# Patient Record
Sex: MALE | Race: WHITE | NOT HISPANIC OR LATINO | Employment: FULL TIME | URBAN - METROPOLITAN AREA
[De-identification: names, ages, dates, MRNs, and addresses within clinical notes are randomized per-mention and may not be internally consistent; named-entity substitution may affect disease eponyms.]

---

## 2021-07-16 ENCOUNTER — HISTORICAL (OUTPATIENT)
Dept: ADMINISTRATIVE | Facility: HOSPITAL | Age: 46
End: 2021-07-16

## 2021-07-20 ENCOUNTER — HISTORICAL (OUTPATIENT)
Dept: ADMINISTRATIVE | Facility: HOSPITAL | Age: 46
End: 2021-07-20

## 2021-07-20 LAB
ALBUMIN SERPL-MCNC: 4.4 GM/DL (ref 3.5–5)
ALBUMIN/GLOB SERPL: 1.7 RATIO (ref 1.1–2)
ALP SERPL-CCNC: 67 UNIT/L (ref 40–150)
ALT SERPL-CCNC: 16 UNIT/L (ref 0–55)
AST SERPL-CCNC: 22 UNIT/L (ref 5–34)
BILIRUB SERPL-MCNC: 3.2 MG/DL
BILIRUBIN DIRECT+TOT PNL SERPL-MCNC: 0.7 MG/DL (ref 0–0.5)
BILIRUBIN DIRECT+TOT PNL SERPL-MCNC: 2.5 MG/DL (ref 0–0.8)
BUN SERPL-MCNC: 14.8 MG/DL (ref 8.9–20.6)
CALCIUM SERPL-MCNC: 9.8 MG/DL (ref 8.4–10.2)
CHLORIDE SERPL-SCNC: 104 MMOL/L (ref 98–107)
CO2 SERPL-SCNC: 27 MMOL/L (ref 22–29)
CREAT SERPL-MCNC: 0.87 MG/DL (ref 0.73–1.18)
EST CREAT CLEARANCE SER (OHS): 111.45 ML/MIN
GLOBULIN SER-MCNC: 2.6 GM/DL (ref 2.4–3.5)
GLUCOSE SERPL-MCNC: 97 MG/DL (ref 74–100)
POTASSIUM SERPL-SCNC: 3.6 MMOL/L (ref 3.5–5.1)
PROT SERPL-MCNC: 7 GM/DL (ref 6.4–8.3)
SARS-COV-2 RNA RESP QL NAA+PROBE: NOT DETECTED
SODIUM SERPL-SCNC: 137 MMOL/L (ref 136–145)

## 2021-07-22 ENCOUNTER — HISTORICAL (OUTPATIENT)
Dept: SURGERY | Facility: HOSPITAL | Age: 46
End: 2021-07-22

## 2022-04-10 ENCOUNTER — HISTORICAL (OUTPATIENT)
Dept: ADMINISTRATIVE | Facility: HOSPITAL | Age: 47
End: 2022-04-10

## 2022-04-29 VITALS
WEIGHT: 199.94 LBS | DIASTOLIC BLOOD PRESSURE: 76 MMHG | BODY MASS INDEX: 30.01 KG/M2 | BODY MASS INDEX: 27.99 KG/M2 | HEIGHT: 71 IN | HEIGHT: 68 IN | WEIGHT: 198 LBS | SYSTOLIC BLOOD PRESSURE: 131 MMHG

## 2022-04-30 NOTE — H&P
Patient:   Jay Rincon             MRN: [id number]            FIN: [id number]               Age:   45 years     Sex:  Male     :  1975   Associated Diagnoses:   None   Author:   Juan COLLADO, Mary Martino      Chief Complaint   C/O laceration to Lt hand possible surgery.   History of Present Illness   45-year-old male who presents with pain and inability to use the left arm. 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 He says that on 2021 a ceramic tile fell on the dorsal side of his hand and he sustained a small laceration about the first dorsal webspace. After injury he went to an urgent care received antibiotics which she is still taking in the superficial skin was closed. He has inability to extend about the IP joint of the thumb.   Review of Systems   Constitutional: no fever, fatigue, weakness  Eye: no vision loss, eye redness, drainage, or pain  ENMT: no sore throat, ear pain, sinus pain/congestion, nasal congestion/drainage  Respiratory: no cough, no wheezing, no shortness of breath  Cardiovascular: no chest pain, no palpitations, no edema  Gastrointestinal: no nausea, vomiting, or diarrhea. No abdominal pain      Physical Exam   General: No acute distress  CV: Regular rhythm by peripheral pulse  Lungs: Unlabored breathing on room air  Left hand  Has a small 1 cm laceration about the first dorsal compartment with sutures in place, no surrounding fluctuance or drainage.  NTTP throughout the hand.  The thumb is held flexed about the IP joint. He is unable to actively extend about the IP joint, able to passively extend to neutral. He is able to abduct and adduct about the thumb.  He is able to flex/extend about the first CMC joint. He is able to flex/extend about fingers 24  Sensation intact throughout the hand. He has some moderate numbness over the ulnar side of the thumb.  RP 2+, fingers are warm well perfused, cap refill less than 2 seconds throughout all  fingers    Radiology  Independent review  X-ray left hand demonstrates no acute osseous abnormalities, fractures, or dislocations   Assessment/Plan   Extensor tendon rupture of hand S66.819A   45-year-old male who has a left EPL tendon laceration sustained on 7/12/2021.  - OR today for Left EPL tendon repair

## 2022-04-30 NOTE — OP NOTE
Patient:   Jay Rincon             MRN: [id number]            FIN: [id number]               Age:   45 years     Sex:  Male     :  1975   Associated Diagnoses:   None   Author:   Mauri COLLADO, Zion      Operative Note   Operative Information   Date/ Time:  2021 08:25:00.     Procedures Performed: Left extensor pollicis longus tendon repair   .     Indications: 45M suffered an injury to the left hand about 3 weeks prior when a piece of ceramic fell onto the hand causing a laceration. He was unable to extend his thumb. Indicated for operative repair. .     Preoperative Diagnosis: Left extensor pollicis longus tendon rupture.     Postoperative Diagnosis: Left extensor pollicis longus tendon rupture.     Surgeon: Taurus Randall MD     Assistant: Zion Dotson MD .     Anesthesia: MAC + Local.     Intake and Output: NA.     Speciman Removed: NA.     Medications: 5cc 0.5% marcaine 5cc 1% lidocaine with epinephrine.     Esimated blood loss: loss  10  cc.     Description of Procedure/Findings/    Complications: Coomplete laceration of EPL tendon zone 4.     Complications: None.     Notes: Patient was seen and examined in the perop holding area. Site was markes, consents verified, and H&P updated. He was taken to the OR and placed supine on a table, arm board, MAC anaesthesia. A timeout was held. Preop ancef was administered. A tourniquet was raised to 250mm Hg.     We bagan our surgery by extending the traumautic laceration over the thumb proximally and distally about 1.5-2cm each direction. Care was taken to protect the radial sensory nerves and superficial veins encountered. The distal and proximal tendon stumps were identified and cleaned of pseudotendon. The 2 ends were repaired with 4-0 ethibond suture in a andrews and figure of 8 fashion with 4 crossing strands. The repiar was reinforced with a circumferential 5-0 running nylon stitch. This was done in thumb extension. Happy with the repair, the  site was washed, tourniquet let down and hemostasis ensured. We closed the wound with 4-0 monocryl in simple subcutaneous fashion followed by a running subcuticular stitch. A splint was placed after dermabond and steristrips. This concluded the operation. All needle and spong counts were correct.     Post Op Plan    Splint 2 weeks  RTC 2 weeks for wound check and transition to thumb spica splint  May begin gentle ROM at that time  NWB thumb 6 weeks   At 6 weeks, may begin to gently increase activities to full by 12 weeks .

## 2022-05-02 NOTE — HISTORICAL [facility] CERNER
This is a historical note converted from [employer]. Formatting and pictures may have been removed.  Please reference Ketty for original formatting and attached multimedia. Chief Complaint  C/O laceration to Lt hand possible surgery.  History of Present Illness  45-year-old male who presents with?pain and inability to use the left arm.? 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 He says that on 7/12/2021?a ceramic tile fell on the dorsal side of his hand?and he sustained a small laceration?about the first?dorsal webspace.? After injury?he went to?an urgent care?received antibiotics which she is still taking?in the superficial?skin was closed. ?He has inability to extend about the?IP joint of the thumb.  Review of Systems  Constitutional:?no fever, fatigue, weakness  Eye:?no vision loss, eye redness, drainage, or pain  ENMT:?no sore throat, ear pain, sinus pain/congestion, nasal congestion/drainage  Respiratory:?no cough, no wheezing, no shortness of breath  Cardiovascular:?no chest pain, no palpitations, no edema  Gastrointestinal:?no nausea, vomiting, or diarrhea. No abdominal pain  ?  ?  ?  Physical Exam  Vitals & Measurements  T:?36.4? ?C (Oral)? HR:?80(Peripheral)? RR:?16? BP:?131/76?  HT:?180.50?cm? WT:?90.700?kg? BMI:?27.84?  General: No acute distress  CV:?Regular rhythm by peripheral pulse  Lungs: Unlabored breathing on room air  Left hand  Has a?small?1 cm laceration?about the?first dorsal compartment with sutures in place, no surrounding fluctuance?or drainage.  NTTP throughout the hand.  The thumb is held flexed about the IP joint. ?He is unable to actively?extend about the IP?joint, able to passively extend to neutral.? He is able to abduct and adduct about the thumb.  He is able to flex/extend?about the first CMC joint.? He is able to flex/extend?about fingers 2-4  Sensation intact throughout the hand. ?He has some?moderate numbness over the?ulnar side of the thumb.  RP 2+, fingers are warm well  perfused, cap refill less than 2 seconds throughout all fingers  ?  Radiology  Independent review  X-ray left hand?demonstrates no acute osseous abnormalities, fractures, or dislocations  Assessment/Plan  Extensor tendon rupture of hand?S66.819A  45-year-old male who has a left?EPL?tendon laceration sustained on 7/12/2021.  ?  -Operative and nonoperative risk?were discussed with patient at length. ?He has elected to proceed with?operative intervention. ?He was booked and consented for?left?extensor tendon primary repair?on 7/22/2021 with Dr. Rodriguez.  ?  ATTENDING ADDENDUM  ?  Jay Rincon?was evaluated at the time of the encounter with?Dr Dotson.? HPI, PE and treatment plan was reviewed.? Treatment plan was reasonable and necessary.??Imaging was reviewed at the time of visit.??  ?  Case discussed with Dr. rodriguez.  ?  The risks, benefits, outcomes, and alternatives of conservative vs operative management were discussed with the patient in clinic today.? Informed consent was obtained for?EPL repair with Dr. Rodriguez.  ?  Ordered:  XR Hand Left Minimum 3 Views, Routine, 07/16/21 9:19:00 CDT, None, Ambulatory, Rad Type, Extensor tendon rupture of hand, Not Scheduled, 07/16/21 9:19:00 CDT  ?   Medications  Keflex 500 mg oral capsule, 500 mg= 1 cap(s), Oral, QID  Lortab 7.5/500 oral tablet, 1 tab(s), Oral, q4hr,? ?Not Taking, Completed Rx  Zofran ODT 4 mg oral tablet, disintegrating, 1, Oral, q4hr,? ?Not Taking, Completed Rx

## 2022-11-10 ENCOUNTER — LAB VISIT (OUTPATIENT)
Dept: LAB | Facility: HOSPITAL | Age: 47
End: 2022-11-10
Attending: SURGERY

## 2022-11-10 DIAGNOSIS — Z01.818 PREOPERATIVE EXAMINATION, UNSPECIFIED: Primary | ICD-10-CM

## 2022-11-10 LAB
ANION GAP SERPL CALC-SCNC: 8 MEQ/L
BASOPHILS # BLD AUTO: 0.03 X10(3)/MCL (ref 0–0.2)
BASOPHILS NFR BLD AUTO: 0.7 %
BUN SERPL-MCNC: 17.2 MG/DL (ref 8.9–20.6)
CALCIUM SERPL-MCNC: 9.6 MG/DL (ref 8.4–10.2)
CHLORIDE SERPL-SCNC: 104 MMOL/L (ref 98–107)
CO2 SERPL-SCNC: 26 MMOL/L (ref 22–29)
CREAT SERPL-MCNC: 0.9 MG/DL (ref 0.73–1.18)
CREAT/UREA NIT SERPL: 19
EOSINOPHIL # BLD AUTO: 0.06 X10(3)/MCL (ref 0–0.9)
EOSINOPHIL NFR BLD AUTO: 1.4 %
ERYTHROCYTE [DISTWIDTH] IN BLOOD BY AUTOMATED COUNT: 11.9 % (ref 11.5–17)
GFR SERPLBLD CREATININE-BSD FMLA CKD-EPI: >60 MLS/MIN/1.73/M2
GLUCOSE SERPL-MCNC: 82 MG/DL (ref 74–100)
HCT VFR BLD AUTO: 45.2 % (ref 42–52)
HGB BLD-MCNC: 15.3 GM/DL (ref 14–18)
IMM GRANULOCYTES # BLD AUTO: 0.01 X10(3)/MCL (ref 0–0.04)
IMM GRANULOCYTES NFR BLD AUTO: 0.2 %
LYMPHOCYTES # BLD AUTO: 1.88 X10(3)/MCL (ref 0.6–4.6)
LYMPHOCYTES NFR BLD AUTO: 44.7 %
MCH RBC QN AUTO: 30.5 PG (ref 27–31)
MCHC RBC AUTO-ENTMCNC: 33.8 MG/DL (ref 33–36)
MCV RBC AUTO: 90.2 FL (ref 80–94)
MONOCYTES # BLD AUTO: 0.34 X10(3)/MCL (ref 0.1–1.3)
MONOCYTES NFR BLD AUTO: 8.1 %
NEUTROPHILS # BLD AUTO: 1.9 X10(3)/MCL (ref 2.1–9.2)
NEUTROPHILS NFR BLD AUTO: 44.9 %
NRBC BLD AUTO-RTO: 0 %
PLATELET # BLD AUTO: 270 X10(3)/MCL (ref 130–400)
PMV BLD AUTO: 9.2 FL (ref 7.4–10.4)
POTASSIUM SERPL-SCNC: 4 MMOL/L (ref 3.5–5.1)
RBC # BLD AUTO: 5.01 X10(6)/MCL (ref 4.7–6.1)
SODIUM SERPL-SCNC: 138 MMOL/L (ref 136–145)
WBC # SPEC AUTO: 4.2 X10(3)/MCL (ref 4.5–11.5)

## 2022-11-10 PROCEDURE — 36415 COLL VENOUS BLD VENIPUNCTURE: CPT

## 2022-11-10 PROCEDURE — 85025 COMPLETE CBC W/AUTO DIFF WBC: CPT

## 2022-11-10 PROCEDURE — 80048 BASIC METABOLIC PNL TOTAL CA: CPT

## 2022-11-13 ENCOUNTER — PATIENT MESSAGE (OUTPATIENT)
Dept: ADMINISTRATIVE | Facility: OTHER | Age: 47
End: 2022-11-13

## 2022-11-14 ENCOUNTER — PATIENT MESSAGE (OUTPATIENT)
Dept: ADMINISTRATIVE | Facility: OTHER | Age: 47
End: 2022-11-14

## 2022-11-15 ENCOUNTER — PATIENT MESSAGE (OUTPATIENT)
Dept: ADMINISTRATIVE | Facility: OTHER | Age: 47
End: 2022-11-15

## 2022-11-15 PROBLEM — K40.90 LEFT INGUINAL HERNIA: Status: ACTIVE | Noted: 2022-11-15

## 2024-11-22 ENCOUNTER — OFFICE VISIT (OUTPATIENT)
Dept: FAMILY MEDICINE | Facility: CLINIC | Age: 49
End: 2024-11-22
Payer: COMMERCIAL

## 2024-11-22 VITALS
WEIGHT: 213.88 LBS | RESPIRATION RATE: 18 BRPM | HEIGHT: 71 IN | HEART RATE: 79 BPM | TEMPERATURE: 98 F | BODY MASS INDEX: 29.94 KG/M2 | SYSTOLIC BLOOD PRESSURE: 122 MMHG | DIASTOLIC BLOOD PRESSURE: 84 MMHG | OXYGEN SATURATION: 97 %

## 2024-11-22 DIAGNOSIS — Z13.220 ENCOUNTER FOR LIPID SCREENING FOR CARDIOVASCULAR DISEASE: ICD-10-CM

## 2024-11-22 DIAGNOSIS — Z13.6 ENCOUNTER FOR LIPID SCREENING FOR CARDIOVASCULAR DISEASE: ICD-10-CM

## 2024-11-22 DIAGNOSIS — Z83.49 FAMILY HISTORY OF THYROID DISEASE: ICD-10-CM

## 2024-11-22 DIAGNOSIS — Z12.11 COLON CANCER SCREENING: ICD-10-CM

## 2024-11-22 DIAGNOSIS — Z80.8 FAMILY HISTORY OF SKIN CANCER: ICD-10-CM

## 2024-11-22 DIAGNOSIS — Z00.00 WELLNESS EXAMINATION: Primary | ICD-10-CM

## 2024-11-22 DIAGNOSIS — N52.9 ERECTILE DYSFUNCTION, UNSPECIFIED ERECTILE DYSFUNCTION TYPE: ICD-10-CM

## 2024-11-22 DIAGNOSIS — Z12.5 SCREENING FOR MALIGNANT NEOPLASM OF PROSTATE: ICD-10-CM

## 2024-11-22 DIAGNOSIS — R73.01 ELEVATED FASTING GLUCOSE: ICD-10-CM

## 2024-11-22 DIAGNOSIS — E34.9 TESTOSTERONE DEFICIENCY: ICD-10-CM

## 2024-11-22 PROBLEM — K40.90 LEFT INGUINAL HERNIA: Status: RESOLVED | Noted: 2022-11-15 | Resolved: 2024-11-22

## 2024-11-22 NOTE — PROGRESS NOTES
Subjective:      Patient ID: Jay Rincon is a 49 y.o. male.    Chief Complaint: Establish Care (Establishing Care/No previous PCP/Testosterone Levels)    HPI  New Patient Wellness  No acute concerns  Patient is  to Maru Rincon, patient in practice. He is a , owns his own business, non smoker, eats mostly healthy less processed foods, avoiding seed oils, health conscious, working out during the week; does drink maybe 1-2 beers daily during week on average    Specialist: has seen dermatology this year, did have precancerous lesion removed from face    Chronic Medical Conditions:  Family history of skin cancer: patient UTD on check up 2024    Surgery history:  Left thumb tendon repair  Left inguinal hernia repair     Allergies:   NKDA    Family history:  Mom alive healthy  Dad alive, cancer skin   Brother  Sister  Paternal Uncle: Pancreatic cancer    Heart Disease:  Discussed possibly ordering CT calcium score next OV   Colon cancer screening: agreeable to cologuard      Health Maintenance Due   Topic Date Due    Hepatitis C Screening  Never done    Lipid Panel  Never done    HIV Screening  Never done    Hemoglobin A1c (Diabetic Prevention Screening)  Never done    Colorectal Cancer Screening  Never done     Review of Systems   Constitutional:  Negative for chills, fatigue and fever.   HENT:  Negative for congestion, ear pain, postnasal drip, rhinorrhea, sinus pressure and sore throat.    Eyes:  Negative for itching and visual disturbance.   Respiratory:  Negative for cough, shortness of breath and wheezing.    Cardiovascular:  Negative for chest pain, palpitations and leg swelling.   Gastrointestinal:  Negative for abdominal pain and nausea.   Genitourinary:  Negative for dysuria.   Musculoskeletal:  Negative for arthralgias and myalgias.   Skin:  Negative for rash.   Neurological:  Negative for weakness, light-headedness and headaches.       Objective:     Vitals:    11/22/24 0946   BP:  "122/84   BP Location: Left arm   Patient Position: Sitting   Pulse: 79   Resp: 18   Temp: 98 °F (36.7 °C)   TempSrc: Temporal   SpO2: 97%   Weight: 97 kg (213 lb 14.4 oz)   Height: 5' 11" (1.803 m)     Physical Exam  Vitals and nursing note reviewed.   Constitutional:       General: He is not in acute distress.     Appearance: He is well-developed. He is not diaphoretic.   HENT:      Head: Normocephalic and atraumatic.      Right Ear: Tympanic membrane normal.      Left Ear: Tympanic membrane normal.      Mouth/Throat:      Pharynx: No oropharyngeal exudate.   Eyes:      General:         Right eye: No discharge.         Left eye: No discharge.      Conjunctiva/sclera: Conjunctivae normal.      Pupils: Pupils are equal, round, and reactive to light.   Neck:      Thyroid: No thyromegaly.   Cardiovascular:      Rate and Rhythm: Normal rate and regular rhythm.      Heart sounds: Normal heart sounds. No murmur heard.  Pulmonary:      Effort: Pulmonary effort is normal. No respiratory distress.      Breath sounds: Normal breath sounds. No wheezing or rales.   Abdominal:      General: There is no distension.      Palpations: Abdomen is soft.      Tenderness: There is no abdominal tenderness.   Musculoskeletal:      Cervical back: Normal range of motion and neck supple.   Lymphadenopathy:      Cervical: No cervical adenopathy.   Skin:     General: Skin is warm and dry.   Neurological:      Mental Status: He is alert and oriented to person, place, and time.   Psychiatric:         Mood and Affect: Mood normal.         Behavior: Behavior normal.       Assessment/Plan:     1. Wellness examination  -     Comprehensive Metabolic Panel; Future; Expected date: 11/22/2024  -     Lipid Panel; Future; Expected date: 11/22/2024  -     CBC Auto Differential; Future; Expected date: 11/22/2024  -     Hemoglobin A1C; Future; Expected date: 11/22/2024  -     Urinalysis; Future; Expected date: 11/22/2024  -     TSH; Future; Expected date: " 11/22/2024  -     PSA, Screening; Future; Expected date: 11/22/2024  -     Testosterone; Future; Expected date: 11/22/2024    2. Encounter for lipid screening for cardiovascular disease  -     Comprehensive Metabolic Panel; Future; Expected date: 11/22/2024  -     Lipid Panel; Future; Expected date: 11/22/2024  -     CBC Auto Differential; Future; Expected date: 11/22/2024  -     Hemoglobin A1C; Future; Expected date: 11/22/2024  -     Urinalysis; Future; Expected date: 11/22/2024  -     TSH; Future; Expected date: 11/22/2024  -     PSA, Screening; Future; Expected date: 11/22/2024  -     Testosterone; Future; Expected date: 11/22/2024    3. Elevated fasting glucose  -     Comprehensive Metabolic Panel; Future; Expected date: 11/22/2024  -     Lipid Panel; Future; Expected date: 11/22/2024  -     CBC Auto Differential; Future; Expected date: 11/22/2024  -     Hemoglobin A1C; Future; Expected date: 11/22/2024  -     Urinalysis; Future; Expected date: 11/22/2024  -     TSH; Future; Expected date: 11/22/2024  -     PSA, Screening; Future; Expected date: 11/22/2024  -     Testosterone; Future; Expected date: 11/22/2024    4. Screening for malignant neoplasm of prostate  -     Comprehensive Metabolic Panel; Future; Expected date: 11/22/2024  -     Lipid Panel; Future; Expected date: 11/22/2024  -     CBC Auto Differential; Future; Expected date: 11/22/2024  -     Hemoglobin A1C; Future; Expected date: 11/22/2024  -     Urinalysis; Future; Expected date: 11/22/2024  -     TSH; Future; Expected date: 11/22/2024  -     PSA, Screening; Future; Expected date: 11/22/2024  -     Testosterone; Future; Expected date: 11/22/2024    5. Family history of thyroid disease  -     Comprehensive Metabolic Panel; Future; Expected date: 11/22/2024  -     Lipid Panel; Future; Expected date: 11/22/2024  -     CBC Auto Differential; Future; Expected date: 11/22/2024  -     Hemoglobin A1C; Future; Expected date: 11/22/2024  -     Urinalysis;  Future; Expected date: 11/22/2024  -     TSH; Future; Expected date: 11/22/2024  -     PSA, Screening; Future; Expected date: 11/22/2024  -     Testosterone; Future; Expected date: 11/22/2024    6. Erectile dysfunction, unspecified erectile dysfunction type  -     Comprehensive Metabolic Panel; Future; Expected date: 11/22/2024  -     Lipid Panel; Future; Expected date: 11/22/2024  -     CBC Auto Differential; Future; Expected date: 11/22/2024  -     Hemoglobin A1C; Future; Expected date: 11/22/2024  -     Urinalysis; Future; Expected date: 11/22/2024  -     TSH; Future; Expected date: 11/22/2024  -     PSA, Screening; Future; Expected date: 11/22/2024  -     Testosterone; Future; Expected date: 11/22/2024    7. Testosterone deficiency  -     Comprehensive Metabolic Panel; Future; Expected date: 11/22/2024  -     Lipid Panel; Future; Expected date: 11/22/2024  -     CBC Auto Differential; Future; Expected date: 11/22/2024  -     Hemoglobin A1C; Future; Expected date: 11/22/2024  -     Urinalysis; Future; Expected date: 11/22/2024  -     TSH; Future; Expected date: 11/22/2024  -     PSA, Screening; Future; Expected date: 11/22/2024  -     Testosterone; Future; Expected date: 11/22/2024    8. Colon cancer screening  -     Cologuard Screening (Multitarget Stool DNA); Future; Expected date: 11/22/2024    9. Family history of skin cancer       Follow up in about 1 year (around 11/22/2025) for Annual Wellness.    I spent a total of 30 minutes on the day of the visit.This includes face to face time and non-face to face time preparing to see the patient (eg, review of tests), obtaining and/or reviewing separately obtained history, documenting clinical information in the electronic or other health record, independently interpreting results and communicating results to the patient/family/caregiver, or care coordinator.

## 2024-12-06 ENCOUNTER — TELEPHONE (OUTPATIENT)
Dept: FAMILY MEDICINE | Facility: CLINIC | Age: 49
End: 2024-12-06
Payer: COMMERCIAL

## 2024-12-06 DIAGNOSIS — R19.5 POSITIVE COLORECTAL CANCER SCREENING USING COLOGUARD TEST: Primary | ICD-10-CM

## 2024-12-06 NOTE — TELEPHONE ENCOUNTER
I have signed for the following orders AND/OR meds. Please notify the patient and ask the patient to schedule the testing and/or information about any medications that were sent.         Orders Placed This Encounter   Procedures    Ambulatory referral/consult to Gastroenterology     Standing Status:   Future     Standing Expiration Date:   1/6/2026     Referral Priority:   Routine     Referral Type:   Consultation     Referral Reason:   Specialty Services Required     Referred to Provider:   Russell Lora MD     Requested Specialty:   Gastroenterology     Number of Visits Requested:   1

## 2024-12-09 ENCOUNTER — LAB VISIT (OUTPATIENT)
Dept: LAB | Facility: HOSPITAL | Age: 49
End: 2024-12-09
Attending: INTERNAL MEDICINE
Payer: COMMERCIAL

## 2024-12-09 DIAGNOSIS — R73.01 ELEVATED FASTING GLUCOSE: ICD-10-CM

## 2024-12-09 DIAGNOSIS — E34.9 TESTOSTERONE DEFICIENCY: ICD-10-CM

## 2024-12-09 DIAGNOSIS — Z00.00 WELLNESS EXAMINATION: ICD-10-CM

## 2024-12-09 DIAGNOSIS — Z83.49 FAMILY HISTORY OF THYROID DISEASE: ICD-10-CM

## 2024-12-09 DIAGNOSIS — Z13.220 ENCOUNTER FOR LIPID SCREENING FOR CARDIOVASCULAR DISEASE: ICD-10-CM

## 2024-12-09 DIAGNOSIS — N52.9 ERECTILE DYSFUNCTION, UNSPECIFIED ERECTILE DYSFUNCTION TYPE: ICD-10-CM

## 2024-12-09 DIAGNOSIS — Z13.6 ENCOUNTER FOR LIPID SCREENING FOR CARDIOVASCULAR DISEASE: ICD-10-CM

## 2024-12-09 DIAGNOSIS — Z12.5 SCREENING FOR MALIGNANT NEOPLASM OF PROSTATE: ICD-10-CM

## 2024-12-09 LAB
ALBUMIN SERPL-MCNC: 4.1 G/DL (ref 3.5–5)
ALBUMIN/GLOB SERPL: 1.5 RATIO (ref 1.1–2)
ALP SERPL-CCNC: 74 UNIT/L (ref 40–150)
ALT SERPL-CCNC: 27 UNIT/L (ref 0–55)
ANION GAP SERPL CALC-SCNC: 7 MEQ/L
AST SERPL-CCNC: 24 UNIT/L (ref 5–34)
BASOPHILS # BLD AUTO: 0.05 X10(3)/MCL
BASOPHILS NFR BLD AUTO: 1 %
BILIRUB SERPL-MCNC: 2 MG/DL
BILIRUB UR QL STRIP.AUTO: NEGATIVE
BUN SERPL-MCNC: 13.2 MG/DL (ref 8.4–25.7)
CALCIUM SERPL-MCNC: 9.2 MG/DL (ref 8.4–10.2)
CHLORIDE SERPL-SCNC: 107 MMOL/L (ref 98–107)
CHOLEST SERPL-MCNC: 183 MG/DL
CHOLEST/HDLC SERPL: 4 {RATIO} (ref 0–5)
CLARITY UR: CLEAR
CO2 SERPL-SCNC: 27 MMOL/L (ref 22–29)
COLOR UR AUTO: YELLOW
CREAT SERPL-MCNC: 0.99 MG/DL (ref 0.72–1.25)
CREAT/UREA NIT SERPL: 13
EOSINOPHIL # BLD AUTO: 0.11 X10(3)/MCL (ref 0–0.9)
EOSINOPHIL NFR BLD AUTO: 2.3 %
ERYTHROCYTE [DISTWIDTH] IN BLOOD BY AUTOMATED COUNT: 12.1 % (ref 11.5–17)
EST. AVERAGE GLUCOSE BLD GHB EST-MCNC: 96.8 MG/DL
GFR SERPLBLD CREATININE-BSD FMLA CKD-EPI: >60 ML/MIN/1.73/M2
GLOBULIN SER-MCNC: 2.7 GM/DL (ref 2.4–3.5)
GLUCOSE SERPL-MCNC: 97 MG/DL (ref 74–100)
GLUCOSE UR QL STRIP: NEGATIVE
HBA1C MFR BLD: 5 %
HCT VFR BLD AUTO: 42.5 % (ref 42–52)
HDLC SERPL-MCNC: 48 MG/DL (ref 35–60)
HGB BLD-MCNC: 14.2 G/DL (ref 14–18)
HGB UR QL STRIP: NEGATIVE
IMM GRANULOCYTES # BLD AUTO: 0.01 X10(3)/MCL (ref 0–0.04)
IMM GRANULOCYTES NFR BLD AUTO: 0.2 %
KETONES UR QL STRIP: NEGATIVE
LDLC SERPL CALC-MCNC: 116 MG/DL (ref 50–140)
LEUKOCYTE ESTERASE UR QL STRIP: NEGATIVE
LYMPHOCYTES # BLD AUTO: 2.02 X10(3)/MCL (ref 0.6–4.6)
LYMPHOCYTES NFR BLD AUTO: 41.7 %
MCH RBC QN AUTO: 31.3 PG (ref 27–31)
MCHC RBC AUTO-ENTMCNC: 33.4 G/DL (ref 33–36)
MCV RBC AUTO: 93.6 FL (ref 80–94)
MONOCYTES # BLD AUTO: 0.3 X10(3)/MCL (ref 0.1–1.3)
MONOCYTES NFR BLD AUTO: 6.2 %
NEUTROPHILS # BLD AUTO: 2.35 X10(3)/MCL (ref 2.1–9.2)
NEUTROPHILS NFR BLD AUTO: 48.6 %
NITRITE UR QL STRIP: NEGATIVE
NRBC BLD AUTO-RTO: 0 %
PH UR STRIP: 7 [PH]
PLATELET # BLD AUTO: 221 X10(3)/MCL (ref 130–400)
PMV BLD AUTO: 8.5 FL (ref 7.4–10.4)
POTASSIUM SERPL-SCNC: 4 MMOL/L (ref 3.5–5.1)
PROT SERPL-MCNC: 6.8 GM/DL (ref 6.4–8.3)
PROT UR QL STRIP: NEGATIVE
PSA SERPL-MCNC: 0.53 NG/ML
RBC # BLD AUTO: 4.54 X10(6)/MCL (ref 4.7–6.1)
SODIUM SERPL-SCNC: 141 MMOL/L (ref 136–145)
SP GR UR STRIP.AUTO: 1.02 (ref 1–1.03)
TESTOST SERPL-MCNC: 514.94 NG/DL (ref 220.91–715.81)
TRIGL SERPL-MCNC: 94 MG/DL (ref 34–140)
TSH SERPL-ACNC: 2.48 UIU/ML (ref 0.35–4.94)
UROBILINOGEN UR STRIP-ACNC: 0.2
VLDLC SERPL CALC-MCNC: 19 MG/DL
WBC # BLD AUTO: 4.84 X10(3)/MCL (ref 4.5–11.5)

## 2024-12-09 PROCEDURE — 84153 ASSAY OF PSA TOTAL: CPT

## 2024-12-09 PROCEDURE — 81003 URINALYSIS AUTO W/O SCOPE: CPT

## 2024-12-09 PROCEDURE — 84443 ASSAY THYROID STIM HORMONE: CPT

## 2024-12-09 PROCEDURE — 80053 COMPREHEN METABOLIC PANEL: CPT

## 2024-12-09 PROCEDURE — 84403 ASSAY OF TOTAL TESTOSTERONE: CPT

## 2024-12-09 PROCEDURE — 85025 COMPLETE CBC W/AUTO DIFF WBC: CPT

## 2024-12-09 PROCEDURE — 80061 LIPID PANEL: CPT

## 2024-12-09 PROCEDURE — 83036 HEMOGLOBIN GLYCOSYLATED A1C: CPT

## 2024-12-09 PROCEDURE — 36415 COLL VENOUS BLD VENIPUNCTURE: CPT
